# Patient Record
Sex: FEMALE | Race: WHITE | NOT HISPANIC OR LATINO | Employment: FULL TIME | URBAN - METROPOLITAN AREA
[De-identification: names, ages, dates, MRNs, and addresses within clinical notes are randomized per-mention and may not be internally consistent; named-entity substitution may affect disease eponyms.]

---

## 2020-09-18 ENCOUNTER — OFFICE VISIT (OUTPATIENT)
Dept: FAMILY MEDICINE CLINIC | Facility: CLINIC | Age: 65
End: 2020-09-18

## 2020-09-18 VITALS
SYSTOLIC BLOOD PRESSURE: 124 MMHG | HEIGHT: 63 IN | BODY MASS INDEX: 39.83 KG/M2 | WEIGHT: 224.8 LBS | HEART RATE: 68 BPM | OXYGEN SATURATION: 96 % | DIASTOLIC BLOOD PRESSURE: 81 MMHG | RESPIRATION RATE: 16 BRPM | TEMPERATURE: 96.9 F

## 2020-09-18 DIAGNOSIS — R19.7 DIARRHEA, UNSPECIFIED TYPE: ICD-10-CM

## 2020-09-18 DIAGNOSIS — G89.29 CHRONIC MIDLINE LOW BACK PAIN WITHOUT SCIATICA: ICD-10-CM

## 2020-09-18 DIAGNOSIS — I10 ESSENTIAL HYPERTENSION: ICD-10-CM

## 2020-09-18 DIAGNOSIS — Z79.899 POLYPHARMACY: ICD-10-CM

## 2020-09-18 DIAGNOSIS — B37.9 YEAST INFECTION: ICD-10-CM

## 2020-09-18 DIAGNOSIS — R11.10 VOMITING, INTRACTABILITY OF VOMITING NOT SPECIFIED, PRESENCE OF NAUSEA NOT SPECIFIED, UNSPECIFIED VOMITING TYPE: ICD-10-CM

## 2020-09-18 DIAGNOSIS — G47.19 EXCESSIVE DAYTIME SLEEPINESS: ICD-10-CM

## 2020-09-18 DIAGNOSIS — E66.01 MORBID (SEVERE) OBESITY DUE TO EXCESS CALORIES (HCC): ICD-10-CM

## 2020-09-18 DIAGNOSIS — F33.1 MODERATE EPISODE OF RECURRENT MAJOR DEPRESSIVE DISORDER (HCC): ICD-10-CM

## 2020-09-18 DIAGNOSIS — G25.81 RESTLESS LEG: ICD-10-CM

## 2020-09-18 DIAGNOSIS — E11.43 TYPE 2 DIABETES MELLITUS WITH AUTONOMIC NEUROPATHY, UNSPECIFIED WHETHER LONG TERM INSULIN USE (HCC): Primary | ICD-10-CM

## 2020-09-18 DIAGNOSIS — M54.50 CHRONIC MIDLINE LOW BACK PAIN WITHOUT SCIATICA: ICD-10-CM

## 2020-09-18 DIAGNOSIS — E78.49 OTHER HYPERLIPIDEMIA: ICD-10-CM

## 2020-09-18 DIAGNOSIS — F51.04 PSYCHOPHYSIOLOGICAL INSOMNIA: ICD-10-CM

## 2020-09-18 PROBLEM — E11.65 TYPE 2 DIABETES MELLITUS WITH HYPERGLYCEMIA, WITHOUT LONG-TERM CURRENT USE OF INSULIN (HCC): Status: ACTIVE | Noted: 2020-09-18

## 2020-09-18 LAB
25(OH)D3 SERPL-MCNC: 58.4 NG/ML (ref 30–100)
ALBUMIN SERPL-MCNC: 4.7 G/DL (ref 3.5–5.2)
ALBUMIN UR-MCNC: <1.2 MG/DL
ALBUMIN/GLOB SERPL: 1.3 G/DL
ALP SERPL-CCNC: 84 U/L (ref 39–117)
ALT SERPL W P-5'-P-CCNC: 23 U/L (ref 1–33)
ANION GAP SERPL CALCULATED.3IONS-SCNC: 10.6 MMOL/L (ref 5–15)
AST SERPL-CCNC: 19 U/L (ref 1–32)
BASOPHILS # BLD AUTO: 0.03 10*3/MM3 (ref 0–0.2)
BASOPHILS NFR BLD AUTO: 0.3 % (ref 0–1.5)
BILIRUB SERPL-MCNC: 0.7 MG/DL (ref 0–1.2)
BUN SERPL-MCNC: 15 MG/DL (ref 8–23)
BUN/CREAT SERPL: 18.5 (ref 7–25)
CALCIUM SPEC-SCNC: 9.9 MG/DL (ref 8.6–10.5)
CHLORIDE SERPL-SCNC: 102 MMOL/L (ref 98–107)
CHOLEST SERPL-MCNC: 121 MG/DL (ref 0–200)
CO2 SERPL-SCNC: 28.4 MMOL/L (ref 22–29)
CREAT SERPL-MCNC: 0.81 MG/DL (ref 0.57–1)
DEPRECATED RDW RBC AUTO: 43.7 FL (ref 37–54)
EOSINOPHIL # BLD AUTO: 0.09 10*3/MM3 (ref 0–0.4)
EOSINOPHIL NFR BLD AUTO: 1 % (ref 0.3–6.2)
ERYTHROCYTE [DISTWIDTH] IN BLOOD BY AUTOMATED COUNT: 14 % (ref 12.3–15.4)
EXPIRATION DATE: NORMAL
GFR SERPL CREATININE-BSD FRML MDRD: 71 ML/MIN/1.73
GLOBULIN UR ELPH-MCNC: 3.5 GM/DL
GLUCOSE SERPL-MCNC: 123 MG/DL (ref 65–99)
HBA1C MFR BLD: 6.6 %
HCT VFR BLD AUTO: 43.6 % (ref 34–46.6)
HCV AB SER DONR QL: NORMAL
HDLC SERPL-MCNC: 44 MG/DL (ref 40–60)
HGB BLD-MCNC: 14.3 G/DL (ref 12–15.9)
HIV1+2 AB SER QL: NORMAL
IMM GRANULOCYTES # BLD AUTO: 0.03 10*3/MM3 (ref 0–0.05)
IMM GRANULOCYTES NFR BLD AUTO: 0.3 % (ref 0–0.5)
LDLC SERPL CALC-MCNC: 52 MG/DL (ref 0–100)
LDLC/HDLC SERPL: 1.17 {RATIO}
LYMPHOCYTES # BLD AUTO: 2.2 10*3/MM3 (ref 0.7–3.1)
LYMPHOCYTES NFR BLD AUTO: 24.1 % (ref 19.6–45.3)
Lab: NORMAL
MAGNESIUM SERPL-MCNC: 1.9 MG/DL (ref 1.6–2.4)
MCH RBC QN AUTO: 28.1 PG (ref 26.6–33)
MCHC RBC AUTO-ENTMCNC: 32.8 G/DL (ref 31.5–35.7)
MCV RBC AUTO: 85.7 FL (ref 79–97)
MONOCYTES # BLD AUTO: 0.54 10*3/MM3 (ref 0.1–0.9)
MONOCYTES NFR BLD AUTO: 5.9 % (ref 5–12)
NEUTROPHILS NFR BLD AUTO: 6.25 10*3/MM3 (ref 1.7–7)
NEUTROPHILS NFR BLD AUTO: 68.4 % (ref 42.7–76)
NRBC BLD AUTO-RTO: 0 /100 WBC (ref 0–0.2)
PLATELET # BLD AUTO: 267 10*3/MM3 (ref 140–450)
PMV BLD AUTO: 11.8 FL (ref 6–12)
POTASSIUM SERPL-SCNC: 4.6 MMOL/L (ref 3.5–5.2)
PROT SERPL-MCNC: 8.2 G/DL (ref 6–8.5)
RBC # BLD AUTO: 5.09 10*6/MM3 (ref 3.77–5.28)
SODIUM SERPL-SCNC: 141 MMOL/L (ref 136–145)
T3FREE SERPL-MCNC: 3.11 PG/ML (ref 2–4.4)
T4 FREE SERPL-MCNC: 1.05 NG/DL (ref 0.93–1.7)
TRIGL SERPL-MCNC: 127 MG/DL (ref 0–150)
TSH SERPL DL<=0.05 MIU/L-ACNC: 0.9 UIU/ML (ref 0.27–4.2)
VIT B12 BLD-MCNC: 524 PG/ML (ref 211–946)
VLDLC SERPL-MCNC: 25.4 MG/DL (ref 5–40)
WBC # BLD AUTO: 9.14 10*3/MM3 (ref 3.4–10.8)

## 2020-09-18 PROCEDURE — 84481 FREE ASSAY (FT-3): CPT | Performed by: FAMILY MEDICINE

## 2020-09-18 PROCEDURE — 86803 HEPATITIS C AB TEST: CPT | Performed by: FAMILY MEDICINE

## 2020-09-18 PROCEDURE — 99204 OFFICE O/P NEW MOD 45 MIN: CPT | Performed by: FAMILY MEDICINE

## 2020-09-18 PROCEDURE — 82607 VITAMIN B-12: CPT | Performed by: FAMILY MEDICINE

## 2020-09-18 PROCEDURE — 83036 HEMOGLOBIN GLYCOSYLATED A1C: CPT | Performed by: FAMILY MEDICINE

## 2020-09-18 PROCEDURE — 80061 LIPID PANEL: CPT | Performed by: FAMILY MEDICINE

## 2020-09-18 PROCEDURE — 82043 UR ALBUMIN QUANTITATIVE: CPT | Performed by: FAMILY MEDICINE

## 2020-09-18 PROCEDURE — 85025 COMPLETE CBC W/AUTO DIFF WBC: CPT | Performed by: FAMILY MEDICINE

## 2020-09-18 PROCEDURE — 84443 ASSAY THYROID STIM HORMONE: CPT | Performed by: FAMILY MEDICINE

## 2020-09-18 PROCEDURE — 80053 COMPREHEN METABOLIC PANEL: CPT | Performed by: FAMILY MEDICINE

## 2020-09-18 PROCEDURE — 82306 VITAMIN D 25 HYDROXY: CPT | Performed by: FAMILY MEDICINE

## 2020-09-18 PROCEDURE — 84439 ASSAY OF FREE THYROXINE: CPT | Performed by: FAMILY MEDICINE

## 2020-09-18 PROCEDURE — 36415 COLL VENOUS BLD VENIPUNCTURE: CPT | Performed by: FAMILY MEDICINE

## 2020-09-18 PROCEDURE — G0432 EIA HIV-1/HIV-2 SCREEN: HCPCS | Performed by: FAMILY MEDICINE

## 2020-09-18 PROCEDURE — 83735 ASSAY OF MAGNESIUM: CPT | Performed by: FAMILY MEDICINE

## 2020-09-18 RX ORDER — NAPROXEN 500 MG/1
500 TABLET ORAL 2 TIMES DAILY WITH MEALS
Qty: 60 TABLET | Refills: 1 | Status: SHIPPED | OUTPATIENT
Start: 2020-09-18

## 2020-09-18 RX ORDER — PRAMIPEXOLE DIHYDROCHLORIDE 1.5 MG/1
1.5 TABLET ORAL 3 TIMES DAILY
COMMUNITY

## 2020-09-18 RX ORDER — ATORVASTATIN CALCIUM 20 MG/1
20 TABLET, FILM COATED ORAL
COMMUNITY
Start: 2020-06-30

## 2020-09-18 RX ORDER — LOPERAMIDE HYDROCHLORIDE 2 MG/1
2 TABLET ORAL 4 TIMES DAILY PRN
Qty: 30 TABLET | Refills: 1 | Status: SHIPPED | OUTPATIENT
Start: 2020-09-18

## 2020-09-18 RX ORDER — LISINOPRIL 20 MG/1
20 TABLET ORAL DAILY
COMMUNITY
Start: 2020-08-04

## 2020-09-18 RX ORDER — SERTRALINE HYDROCHLORIDE 100 MG/1
TABLET, FILM COATED ORAL
COMMUNITY
Start: 2020-09-10

## 2020-09-18 RX ORDER — HYDROXYZINE HYDROCHLORIDE 25 MG/1
25 TABLET, FILM COATED ORAL 3 TIMES DAILY PRN
COMMUNITY

## 2020-09-18 RX ORDER — FUROSEMIDE 20 MG/1
20 TABLET ORAL 2 TIMES DAILY
COMMUNITY

## 2020-09-18 RX ORDER — EMPAGLIFLOZIN 10 MG/1
1 TABLET, FILM COATED ORAL EVERY MORNING
COMMUNITY
Start: 2020-07-04

## 2020-09-18 RX ORDER — SENNOSIDES 8.6 MG
650 CAPSULE ORAL EVERY 8 HOURS PRN
Qty: 60 TABLET | Refills: 1 | Status: SHIPPED | OUTPATIENT
Start: 2020-09-18

## 2020-09-18 RX ORDER — FLUCONAZOLE 150 MG/1
150 TABLET ORAL ONCE
Qty: 1 TABLET | Refills: 0 | Status: SHIPPED | OUTPATIENT
Start: 2020-09-18 | End: 2020-09-18

## 2020-09-18 RX ORDER — OMEPRAZOLE 40 MG/1
40 CAPSULE, DELAYED RELEASE ORAL DAILY
Qty: 30 CAPSULE | Refills: 1 | Status: SHIPPED | OUTPATIENT
Start: 2020-09-18

## 2020-09-18 NOTE — PROGRESS NOTES
New Patient Office Visit      Patient Name: Yamini Menendez  : 1955   MRN: [id number]     Chief Complaint:    Chief Complaint   Patient presents with   • Diabetes     est care   • Vomiting     x 3 months   • Diarrhea       History of Present Illness: Yamini Menendez is a 65 y.o. female who is here today to establish care.  Patient presents with a multitude of complaints and chronic disease.  Patient reports having stable hyperlipidemia, diabetes, insomnia, restless leg, and depression.  Patient reports that she takes her medications daily and denies side effects.  Patient also has several acute problems including:    Vomiting-patient reports having 1-2 vomiting episodes per week for the last 2 months.  Patient says the episodes are sporadic.  Patient denies accompanying abdominal pain or heartburn symptoms.  Patient is not sure if it is worse after meals but does mention that is happened at night several times.  Patient reports associated diarrhea.  Patient is unsure if anything is made it better.  Patient reports some weight loss recently.  Patient denies any blood in her vomit. Overall patient thinks her symptoms are mild to moderate and are not affecting her life overall.  She thinks it is more of a nuisance.    Diarrhea-patient reports having 2-3 bouts of diarrhea per week for the last couple months.  Patient denies having blood in her diarrhea.  Patient has not tried any medications to help this.  Patient denies any exacerbating or alleviating factors.  Patient thinks the diarrhea is associated with her vomiting.  Patient says the symptoms are mild to moderate overall.  Patient does report some recent weight loss.     Yeast infection-patient reports itchy vaginal skin.  Patient denies discharge.  Patient is on Jardiance which has a risk factor of causing yeast infection.  Patient reports dysuria, urgency, and increased urinary frequency.  Patient reports history of yeast infections as  well.  Patient has been on Jardiance for 4 months.    Excessive daytime sleepiness-patient reports chronic excessive daytime sleepiness.  Patient reports previous diagnosis of sleep apnea but cannot tolerate the sleep machine.    Low back pain-patient reports chronic intermittent low back pain.  Patient says the symptoms come and go.  Patient reports her most recent low back pain exacerbation started 3 days ago and denies any injury.  Patient denies radiation of her symptoms.    Presyncope-patient reports 2 episodes of presyncope over the last 7 months.  Patient reports ER visits to have these episodes evaluated and the work-up was negative.  Patient denies any new medications.  Patient is unsure if she is had palpitations with these events.  Patient denies syncope.          Subjective      Review of Systems:   Review of Systems   Constitutional: Positive for diaphoresis and unexpected weight loss. Negative for fever.   HENT: Negative for ear pain, hearing loss and trouble swallowing.    Eyes: Positive for blurred vision.   Respiratory: Negative for shortness of breath.    Cardiovascular: Positive for palpitations and leg swelling. Negative for chest pain.   Gastrointestinal: Positive for diarrhea and vomiting. Negative for blood in stool.   Endocrine: Positive for polydipsia and polyuria.   Genitourinary: Positive for dysuria, frequency, urgency and urinary incontinence.   Musculoskeletal: Positive for arthralgias and back pain.   Skin: Negative for rash.   Allergic/Immunologic: Negative for environmental allergies.   Neurological: Positive for light-headedness. Negative for weakness.   Hematological: Does not bruise/bleed easily.   Psychiatric/Behavioral: Positive for sleep disturbance and depressed mood.       Past Medical History:   Past Medical History:   Diagnosis Date   • Anxiety    • Cancer (CMS/HCC)    • Depression    • Diabetes mellitus (CMS/HCC)    • Enlarged liver    • Hyperlipidemia    • Hypertension         Past Surgical History:   Past Surgical History:   Procedure Laterality Date   • HYSTERECTOMY     • REPLACEMENT TOTAL KNEE     • SHOULDER SURGERY     • TUMOR REMOVAL      RT thumb       Family History:   Family History   Problem Relation Age of Onset   • Cancer Mother    • Diabetes Mother    • Heart disease Mother    • No Known Problems Father        Social History:   Social History     Socioeconomic History   • Marital status:      Spouse name: Not on file   • Number of children: Not on file   • Years of education: Not on file   • Highest education level: Not on file   Tobacco Use   • Smoking status: Never Smoker   • Smokeless tobacco: Never Used   Substance and Sexual Activity   • Alcohol use: Yes     Comment: socially   • Drug use: Never   • Sexual activity: Yes     Partners: Male       Medications:     Current Outpatient Medications:   •  atorvastatin (LIPITOR) 20 MG tablet, Take 20 mg by mouth every night at bedtime., Disp: , Rfl:   •  Cholecalciferol (VITAMIN D3 PO), Take  by mouth., Disp: , Rfl:   •  furosemide (LASIX) 20 MG tablet, Take 20 mg by mouth 2 (Two) Times a Day., Disp: , Rfl:   •  hydrOXYzine (ATARAX) 25 MG tablet, Take 25 mg by mouth 3 (Three) Times a Day As Needed for Itching., Disp: , Rfl:   •  Jardiance 10 MG tablet, Take 1 tablet by mouth Every Morning., Disp: , Rfl:   •  lisinopril (PRINIVIL,ZESTRIL) 20 MG tablet, Take 20 mg by mouth Daily., Disp: , Rfl:   •  metFORMIN (GLUCOPHAGE) 1000 MG tablet, Take 1,000 mg by mouth 2 (Two) Times a Day., Disp: , Rfl:   •  pramipexole (MIRAPEX) 1.5 MG tablet, Take 1.5 mg by mouth 3 (Three) Times a Day., Disp: , Rfl:   •  sertraline (ZOLOFT) 100 MG tablet, TAKE 1 & 1 2 (ONE & ONE HALF) TABLETS BY MOUTH ONCE DAILY, Disp: , Rfl:   •  acetaminophen (Tylenol 8 Hour) 650 MG 8 hr tablet, Take 1 tablet by mouth Every 8 (Eight) Hours As Needed for Mild Pain ., Disp: 60 tablet, Rfl: 1  •  fluconazole (Diflucan) 150 MG tablet, Take 1 tablet by mouth 1  "(One) Time for 1 dose., Disp: 1 tablet, Rfl: 0  •  loperamide (Imodium A-D) 2 MG tablet, Take 1 tablet by mouth 4 (Four) Times a Day As Needed for Diarrhea., Disp: 30 tablet, Rfl: 1  •  naproxen (Naprosyn) 500 MG tablet, Take 1 tablet by mouth 2 (Two) Times a Day With Meals., Disp: 60 tablet, Rfl: 1  •  omeprazole (priLOSEC) 40 MG capsule, Take 1 capsule by mouth Daily., Disp: 30 capsule, Rfl: 1    Allergies:   Allergies   Allergen Reactions   • Penicillins Anaphylaxis       Objective     Physical Exam:  Vital Signs:   Vitals:    09/18/20 0813   BP: 124/81   Pulse: 68   Resp: 16   Temp: 96.9 °F (36.1 °C)   TempSrc: Temporal   SpO2: 96%   Weight: 102 kg (224 lb 12.8 oz)   Height: 160 cm (63\")     Body mass index is 39.82 kg/m².     Physical Exam  Vitals signs and nursing note reviewed.   Constitutional:       General: She is not in acute distress.     Appearance: She is well-developed.   HENT:      Head: Normocephalic and atraumatic.      Right Ear: External ear normal.      Left Ear: External ear normal.      Nose: Nose normal.   Eyes:      General: No scleral icterus.        Right eye: No discharge.         Left eye: No discharge.      Conjunctiva/sclera: Conjunctivae normal.      Pupils: Pupils are equal, round, and reactive to light.   Neck:      Musculoskeletal: Normal range of motion and neck supple.   Cardiovascular:      Rate and Rhythm: Normal rate and regular rhythm.      Heart sounds: Normal heart sounds. No murmur.   Pulmonary:      Effort: Pulmonary effort is normal. No respiratory distress.      Breath sounds: Normal breath sounds. No wheezing.   Abdominal:      General: Bowel sounds are normal. There is no distension.      Palpations: Abdomen is soft.      Tenderness: There is no abdominal tenderness.   Musculoskeletal: Normal range of motion.         General: Tenderness (Tenderness to palpation of mid to lower back along right side) present.   Lymphadenopathy:      Cervical: No cervical adenopathy. "   Skin:     General: Skin is warm and dry.      Capillary Refill: Capillary refill takes 2 to 3 seconds.      Findings: No rash.   Neurological:      Mental Status: She is alert and oriented to person, place, and time.      Cranial Nerves: No cranial nerve deficit.      Sensory: No sensory deficit.      Motor: No abnormal muscle tone.      Deep Tendon Reflexes: Reflexes normal.   Psychiatric:         Behavior: Behavior normal.         Thought Content: Thought content normal.         Judgment: Judgment normal.         Assessment / Plan      Assessment/Plan:   Yamini was seen today for diabetes, vomiting and diarrhea.    Diagnoses and all orders for this visit:    Type 2 diabetes mellitus with autonomic neuropathy, unspecified whether long term insulin use (CMS/[facility])  -     POC Glycosylated Hemoglobin (Hb A1C)  -     MicroAlbumin, Urine, Random - Urine, Clean Catch    Other hyperlipidemia  -     Lipid Panel    Psychophysiological insomnia  -Continue hydroxyzine as needed.  Consider stopping this in future due to side effects.    Essential hypertension  -     Comprehensive Metabolic Panel    Moderate episode of recurrent major depressive disorder (CMS/[facility])  -Continue sertraline    Restless leg  -Continue pramipexole    Yeast infection  -     fluconazole (Diflucan) 150 MG tablet; Take 1 tablet by mouth 1 (One) Time for 1 dose.    Vomiting, intractability of vomiting not specified, presence of nausea not specified, unspecified vomiting type  -     CBC & Differential  -     Comprehensive Metabolic Panel  -     Hepatitis C Antibody  -     TSH Rfx On Abnormal To Free T4  -     Vitamin B12  -     Vitamin D 25 Hydroxy  -     HIV-1 & HIV-2 Antibodies  -     T4, Free  -     T3, Free  -     Magnesium  -     omeprazole (priLOSEC) 40 MG capsule; Take 1 capsule by mouth Daily.  -     CBC Auto Differential  -     HIV-1 / O / 2 Ag / Antibody 4th Generation    Diarrhea, unspecified type  -     CBC & Differential  -     Comprehensive  Metabolic Panel  -     Hepatitis C Antibody  -     TSH Rfx On Abnormal To Free T4  -     Vitamin B12  -     Vitamin D 25 Hydroxy  -     HIV-1 & HIV-2 Antibodies  -     T4, Free  -     T3, Free  -     Magnesium  -     loperamide (Imodium A-D) 2 MG tablet; Take 1 tablet by mouth 4 (Four) Times a Day As Needed for Diarrhea.  -     CBC Auto Differential  -     HIV-1 / O / 2 Ag / Antibody 4th Generation    Chronic midline low back pain without sciatica  -     naproxen (Naprosyn) 500 MG tablet; Take 1 tablet by mouth 2 (Two) Times a Day With Meals.  -     acetaminophen (Tylenol 8 Hour) 650 MG 8 hr tablet; Take 1 tablet by mouth Every 8 (Eight) Hours As Needed for Mild Pain .    Excessive daytime sleepiness  -     Vitamin B12  -     Vitamin D 25 Hydroxy    Polypharmacy  -Patient's medication list complicates every aspect of her care.  There is multiple medications that cause her at risk of having side effects.  We will try to decrease her medications in the future.    Morbid (severe) obesity due to excess calories (CMS/[facility])     -Patient's obesity is uncontrolled and complicates every aspect of her care.    1. Patient has multiple acute complaints today.  We will do a simple lab work-up to rule out any organic disease.  Retrohilar trial of PPI for her vomiting.  Were to keep track of her symptoms related to her diarrhea and presyncope and readdress this at a later time if it does not improve or gets worse.  2.  We will need to locate patient's previous records from her previous PCP and from her previous ER visits.  3.  I spent a total of 65 minutes on this patient's visit today.  40 minutes was spent face to face and over half of that time was spent counseling the patient and coordinating her care regarding her multiple chronic and acute complaints.  I counseled patient on low back stretches and use of yoga for her low back pain.  Also counseled patient on need for a symptom diary regarding her vomiting, diarrhea, and  presyncope.  Also counseled patient on the need for her to try alternative sleep apnea treatments.  Also counseled patient on her medications risk of side effects and the  need to address this at a later visit      Follow Up:   Return in about 4 weeks (around 10/16/2020) for vomit, diarrhea.    Bin Aj,   [facility] [place]       Please note that portions of this note may have been completed with a voice recognition program. Efforts were made to edit the dictations, but occasionally words are mistranscribed.

## 2022-04-07 ENCOUNTER — TELEPHONE (OUTPATIENT)
Dept: NEUROLOGY | Facility: OTHER | Age: 67
End: 2022-04-07

## 2022-04-07 NOTE — TELEPHONE ENCOUNTER
AMADO W/ DON KATHLEEN CALLING TO CHECK IF WE HAD RECEIVED REFERRAL ON PT.  INFORMED HER WE HAVE NOT.  AMADO SAID SHE WILL RESEND.  PT WAS ONE OF 4 AMADO WAS CALLING ON, SO REFERRAL INFO WAS NOT OBTAINED.